# Patient Record
Sex: FEMALE | Race: WHITE
[De-identification: names, ages, dates, MRNs, and addresses within clinical notes are randomized per-mention and may not be internally consistent; named-entity substitution may affect disease eponyms.]

---

## 2019-01-09 VITALS — SYSTOLIC BLOOD PRESSURE: 148 MMHG | DIASTOLIC BLOOD PRESSURE: 74 MMHG

## 2019-01-09 LAB
BASOPHILS NFR BLD AUTO: 1 % (ref 0–5)
BUN SERPL-MCNC: 17 MG/DL (ref 7–18)
CHLORIDE SERPL-SCNC: 103 MMOL/L (ref 101–111)
CO2 SERPL-SCNC: 29 MMOL/L (ref 21–32)
CREAT SERPL-MCNC: 0.6 MG/DL (ref 0.5–1.5)
EOSINOPHIL NFR BLD AUTO: 2.8 % (ref 0–8)
ERYTHROCYTE [DISTWIDTH] IN BLOOD BY AUTOMATED COUNT: 13.9 % (ref 11–15.5)
GFR SERPL CREATININE-BSD FRML MDRD: 103 ML/MIN (ref 60–?)
GLUCOSE SERPL-MCNC: 121 MG/DL (ref 70–105)
HCT VFR BLD AUTO: 43.4 % (ref 36–48)
LYMPHOCYTES NFR SPEC AUTO: 23.5 % (ref 21–51)
MCH RBC QN AUTO: 30.1 PG (ref 27–33)
MCHC RBC AUTO-ENTMCNC: 33.4 G/DL (ref 32–36)
MCV RBC AUTO: 90.4 FL (ref 79–99)
MONOCYTES NFR BLD AUTO: 8.9 % (ref 3–13)
NEUTROPHILS NFR BLD AUTO: 63.8 % (ref 40–77)
NRBC BLD MANUAL-RTO: 0.1 % (ref 0–0.19)
PLATELET # BLD AUTO: 226 K/UL (ref 130–400)
POTASSIUM SERPL-SCNC: 3.5 MMOL/L (ref 3.5–5.1)
RBC # BLD AUTO: 4.81 MIL/UL (ref 4–5.5)
SODIUM SERPL-SCNC: 141 MMOL/L (ref 136–145)
WBC # BLD AUTO: 6.3 K/UL (ref 4.8–10.8)

## 2019-01-09 NOTE — NUR
EKG

EKG RESULT OKAY AS PER AKIL YOUNGBLOOD, [employer] DAY PT MANAGER.  NO NEED TO FURTHER REPORT TO 
ANESTHESIA.

## 2019-01-11 ENCOUNTER — HOSPITAL ENCOUNTER (OUTPATIENT)
Dept: HOSPITAL 90 - DAH | Age: 79
Discharge: HOME | End: 2019-01-11
Attending: UROLOGY
Payer: MEDICARE

## 2019-01-11 VITALS — SYSTOLIC BLOOD PRESSURE: 123 MMHG | DIASTOLIC BLOOD PRESSURE: 63 MMHG

## 2019-01-11 VITALS — DIASTOLIC BLOOD PRESSURE: 63 MMHG | SYSTOLIC BLOOD PRESSURE: 138 MMHG

## 2019-01-11 VITALS — WEIGHT: 255.2 LBS | BODY MASS INDEX: 42.52 KG/M2 | HEIGHT: 65 IN

## 2019-01-11 VITALS — DIASTOLIC BLOOD PRESSURE: 65 MMHG | SYSTOLIC BLOOD PRESSURE: 124 MMHG

## 2019-01-11 VITALS — SYSTOLIC BLOOD PRESSURE: 137 MMHG | DIASTOLIC BLOOD PRESSURE: 62 MMHG

## 2019-01-11 VITALS — DIASTOLIC BLOOD PRESSURE: 68 MMHG | SYSTOLIC BLOOD PRESSURE: 124 MMHG

## 2019-01-11 VITALS — DIASTOLIC BLOOD PRESSURE: 61 MMHG | SYSTOLIC BLOOD PRESSURE: 123 MMHG

## 2019-01-11 VITALS — DIASTOLIC BLOOD PRESSURE: 64 MMHG | SYSTOLIC BLOOD PRESSURE: 122 MMHG

## 2019-01-11 VITALS — DIASTOLIC BLOOD PRESSURE: 60 MMHG | SYSTOLIC BLOOD PRESSURE: 138 MMHG

## 2019-01-11 VITALS — SYSTOLIC BLOOD PRESSURE: 142 MMHG | DIASTOLIC BLOOD PRESSURE: 85 MMHG

## 2019-01-11 VITALS — SYSTOLIC BLOOD PRESSURE: 126 MMHG | DIASTOLIC BLOOD PRESSURE: 75 MMHG

## 2019-01-11 DIAGNOSIS — K58.9: ICD-10-CM

## 2019-01-11 DIAGNOSIS — Z79.899: ICD-10-CM

## 2019-01-11 DIAGNOSIS — E66.01: ICD-10-CM

## 2019-01-11 DIAGNOSIS — I10: ICD-10-CM

## 2019-01-11 DIAGNOSIS — Z98.890: ICD-10-CM

## 2019-01-11 DIAGNOSIS — R15.9: ICD-10-CM

## 2019-01-11 DIAGNOSIS — R35.0: Primary | ICD-10-CM

## 2019-01-11 DIAGNOSIS — Z88.8: ICD-10-CM

## 2019-01-11 PROCEDURE — 80048 BASIC METABOLIC PNL TOTAL CA: CPT

## 2019-01-11 PROCEDURE — 85025 COMPLETE CBC W/AUTO DIFF WBC: CPT

## 2019-01-11 PROCEDURE — 64590 INS/RPL PRPH SAC/GSTR NPG/R: CPT

## 2019-01-11 PROCEDURE — 36415 COLL VENOUS BLD VENIPUNCTURE: CPT

## 2019-01-11 PROCEDURE — 93005 ELECTROCARDIOGRAM TRACING: CPT

## 2019-01-11 PROCEDURE — 64561 IMPLANT NEUROELECTRODES: CPT

## 2019-01-11 RX ADMIN — LEVOFLOXACIN SCH: 5 INJECTION, SOLUTION INTRAVENOUS at 08:25

## 2019-01-11 RX ADMIN — LEVOFLOXACIN SCH MLS/HR: 5 INJECTION, SOLUTION INTRAVENOUS at 06:00

## 2019-01-11 NOTE — NUR
dc

dc instructions given to pt / pt spouse with rx, instructed to f/u with dr. hernandez for 
removal of wire on 1-16-19, instructed to stop aspirin and on new medication. also 
instructed to start testing on right side -swith to left side on sunday on sacral nerve 
stimulator ,pt verbalized understanding. stated [employer] tech instructed her today 
before sx , piv removed to left arm, catheter intact, site asymtomatic,

## 2019-01-11 NOTE — NUR
SX

RECEIVED PT FROM PACU, S/P SACRAL NERVE STIMULATOR , TEGADERM DRESSING TO LOWER BACK DRY AND 
INTACT, PT DENIED ANY PAIN OR DISCOMFORTS. VS STABLE ON ARRIVAL FAMILY  CALLED TO ROOM ON 
ARRIVAL.

## 2019-01-11 NOTE — NUR
dc

pt dc home via w/c ,no distress noted. denied any pain or discomforts. tegaderm dressing to 
sacral area dry and intact, sacral nerve stimulator attached to patient. spouse accompanied 
patient.